# Patient Record
Sex: FEMALE | NOT HISPANIC OR LATINO | URBAN - METROPOLITAN AREA
[De-identification: names, ages, dates, MRNs, and addresses within clinical notes are randomized per-mention and may not be internally consistent; named-entity substitution may affect disease eponyms.]

---

## 2024-11-20 ENCOUNTER — OFFICE VISIT (OUTPATIENT)
Dept: URGENT CARE | Age: 19
End: 2024-11-20
Payer: OTHER GOVERNMENT

## 2024-11-20 VITALS
DIASTOLIC BLOOD PRESSURE: 79 MMHG | RESPIRATION RATE: 16 BRPM | OXYGEN SATURATION: 98 % | TEMPERATURE: 98 F | HEART RATE: 62 BPM | SYSTOLIC BLOOD PRESSURE: 108 MMHG

## 2024-11-20 DIAGNOSIS — Z86.19 HISTORY OF CHLAMYDIA: Primary | ICD-10-CM

## 2024-11-20 DIAGNOSIS — Z11.3 SCREENING FOR STD (SEXUALLY TRANSMITTED DISEASE): ICD-10-CM

## 2024-11-20 LAB
CHLAMYDIA TRACHOMATIS: NOT DETECTED
NEISSERIA GONORRHOEAE: NOT DETECTED
TRICHOMONAS VAGINA: NOT DETECTED

## 2024-11-20 PROCEDURE — 87801 DETECT AGNT MULT DNA AMPLI: CPT | Performed by: PHYSICIAN ASSISTANT

## 2024-11-20 PROCEDURE — 99203 OFFICE O/P NEW LOW 30 MIN: CPT | Performed by: PHYSICIAN ASSISTANT

## 2024-11-20 ASSESSMENT — ENCOUNTER SYMPTOMS
VOMITING: 0
ABDOMINAL PAIN: 0
FEVER: 0
DYSURIA: 0
NAUSEA: 0
CHILLS: 0

## 2024-11-20 NOTE — PROGRESS NOTES
Subjective   Patient ID: Lona Jaramillo is a 19 y.o. female. They present today with a chief complaint of STD check (+ for chlamydia 2 weeks ago/Denies current symptoms, wants retested).    History of Present Illness  Patient presents for STD testing. She states she was diagnosed with chlamydia 2 weeks ago elsewhere. She was not having any symptoms prior to testing. She was treated with 7 day course of Doxycyline and states she finished it and didn't miss any doses. She denies any fevers, chills, vaginal discharge, dysuria, pelvic pain. She states she has not been sexually active since she was treated.          Past Medical History  Allergies as of 11/20/2024    (No Known Allergies)       (Not in a hospital admission)       No past medical history on file.    No past surgical history on file.         Review of Systems  Review of Systems   Constitutional:  Negative for chills and fever.   Gastrointestinal:  Negative for abdominal pain, nausea and vomiting.   Genitourinary:  Negative for dysuria, genital sores, pelvic pain, vaginal discharge and vaginal pain.                                  Objective    Vitals:    11/20/24 1309   BP: 108/79   Pulse: 62   Resp: 16   Temp: 36.7 °C (98 °F)   SpO2: 98%     No LMP recorded.    Physical Exam  Vitals and nursing note reviewed.   Constitutional:       General: She is not in acute distress.  Cardiovascular:      Rate and Rhythm: Normal rate and regular rhythm.      Heart sounds: Normal heart sounds.   Pulmonary:      Effort: Pulmonary effort is normal.      Breath sounds: Normal breath sounds.   Abdominal:      General: Abdomen is flat. Bowel sounds are normal.      Palpations: Abdomen is soft.      Tenderness: There is no abdominal tenderness.   Genitourinary:     Comments: Declined  Neurological:      Mental Status: She is alert.         Procedures    Point of Care Test & Imaging Results from this visit  Results for orders placed or performed in visit on 11/20/24   POCT STI  PCR (GC,TR[facility]) manually resulted   Result Value Ref Range    Chlamydia Trachomatis Not Detected Not Detected    Neisseria Gonorrhoeae Not Detected Not Detected    Trichomonas Vagina Not Detected Not Detected      No results found.    Diagnostic study results (if any) were reviewed by Leny Donohue PA-C.    Assessment/Plan   Allergies, medications, history, and pertinent labs/EKGs/Imaging reviewed by Leny Donohue PA-C.     Medical Decision Making  MDM- Patient presenting for routine STD testing due to recent chlamydia; she was negative. Recommend careful partner selection and condom use to prevent recurrent infection.  Return to clinic if new symptoms occur. Otherwise follow up with PCP. Patient verbalized understanding and agrees with plan.       Orders and Diagnoses  Diagnoses and all orders for this visit:  History of chlamydia  Screening for STD (sexually transmitted disease)  -     POCT STI PCR (GC,TRICH) manually resulted      Medical Admin Record      Patient disposition: Home    Electronically signed by Leny Donohue PA-C  1:38 PM